# Patient Record
Sex: MALE | Race: WHITE | Employment: UNEMPLOYED | URBAN - NONMETROPOLITAN AREA
[De-identification: names, ages, dates, MRNs, and addresses within clinical notes are randomized per-mention and may not be internally consistent; named-entity substitution may affect disease eponyms.]

---

## 2022-01-01 ENCOUNTER — APPOINTMENT (OUTPATIENT)
Dept: CT IMAGING | Age: 32
End: 2022-01-01
Payer: COMMERCIAL

## 2022-01-01 ENCOUNTER — HOSPITAL ENCOUNTER (EMERGENCY)
Age: 32
Discharge: ANOTHER HOSPITAL | End: 2022-01-02
Attending: INTERNAL MEDICINE
Payer: COMMERCIAL

## 2022-01-01 VITALS
OXYGEN SATURATION: 99 % | BODY MASS INDEX: 24.38 KG/M2 | HEIGHT: 72 IN | RESPIRATION RATE: 15 BRPM | DIASTOLIC BLOOD PRESSURE: 82 MMHG | HEART RATE: 88 BPM | SYSTOLIC BLOOD PRESSURE: 125 MMHG | WEIGHT: 180 LBS

## 2022-01-01 DIAGNOSIS — T18.9XXA SWALLOWED FOREIGN BODY, INITIAL ENCOUNTER: Primary | ICD-10-CM

## 2022-01-01 LAB
ALBUMIN SERPL-MCNC: 4.5 G/DL (ref 3.5–5.1)
ALP BLD-CCNC: 43 U/L (ref 38–126)
ALT SERPL-CCNC: 30 U/L (ref 11–66)
ANION GAP SERPL CALCULATED.3IONS-SCNC: 13 MEQ/L (ref 8–16)
AST SERPL-CCNC: 24 U/L (ref 5–40)
BASOPHILS # BLD: 0.4 %
BASOPHILS ABSOLUTE: 0 THOU/MM3 (ref 0–0.1)
BILIRUB SERPL-MCNC: 0.3 MG/DL (ref 0.3–1.2)
BUN BLDV-MCNC: 19 MG/DL (ref 7–22)
CALCIUM SERPL-MCNC: 9.8 MG/DL (ref 8.5–10.5)
CHLORIDE BLD-SCNC: 105 MEQ/L (ref 98–111)
CO2: 24 MEQ/L (ref 23–33)
CREAT SERPL-MCNC: 0.7 MG/DL (ref 0.4–1.2)
EOSINOPHIL # BLD: 0.3 %
EOSINOPHILS ABSOLUTE: 0 THOU/MM3 (ref 0–0.4)
ERYTHROCYTE [DISTWIDTH] IN BLOOD BY AUTOMATED COUNT: 12.9 % (ref 11.5–14.5)
ERYTHROCYTE [DISTWIDTH] IN BLOOD BY AUTOMATED COUNT: 43.6 FL (ref 35–45)
GFR SERPL CREATININE-BSD FRML MDRD: > 90 ML/MIN/1.73M2
GLUCOSE BLD-MCNC: 104 MG/DL (ref 70–108)
HCT VFR BLD CALC: 40.7 % (ref 42–52)
HEMOGLOBIN: 13.8 GM/DL (ref 14–18)
IMMATURE GRANS (ABS): 0.01 THOU/MM3 (ref 0–0.07)
IMMATURE GRANULOCYTES: 0.1 %
LYMPHOCYTES # BLD: 30 %
LYMPHOCYTES ABSOLUTE: 2.1 THOU/MM3 (ref 1–4.8)
MCH RBC QN AUTO: 31.2 PG (ref 26–33)
MCHC RBC AUTO-ENTMCNC: 33.9 GM/DL (ref 32.2–35.5)
MCV RBC AUTO: 91.9 FL (ref 80–94)
MONOCYTES # BLD: 8 %
MONOCYTES ABSOLUTE: 0.6 THOU/MM3 (ref 0.4–1.3)
NUCLEATED RED BLOOD CELLS: 0 /100 WBC
OSMOLALITY CALCULATION: 285.7 MOSMOL/KG (ref 275–300)
PLATELET # BLD: 225 THOU/MM3 (ref 130–400)
PMV BLD AUTO: 10.3 FL (ref 9.4–12.4)
POTASSIUM REFLEX MAGNESIUM: 4.4 MEQ/L (ref 3.5–5.2)
RBC # BLD: 4.43 MILL/MM3 (ref 4.7–6.1)
SARS-COV-2, NAAT: NOT  DETECTED
SEG NEUTROPHILS: 61.2 %
SEGMENTED NEUTROPHILS ABSOLUTE COUNT: 4.3 THOU/MM3 (ref 1.8–7.7)
SODIUM BLD-SCNC: 142 MEQ/L (ref 135–145)
TOTAL PROTEIN: 7.3 G/DL (ref 6.1–8)
WBC # BLD: 7 THOU/MM3 (ref 4.8–10.8)

## 2022-01-01 PROCEDURE — 85025 COMPLETE CBC W/AUTO DIFF WBC: CPT

## 2022-01-01 PROCEDURE — 80053 COMPREHEN METABOLIC PANEL: CPT

## 2022-01-01 PROCEDURE — 36415 COLL VENOUS BLD VENIPUNCTURE: CPT

## 2022-01-01 PROCEDURE — 99283 EMERGENCY DEPT VISIT LOW MDM: CPT

## 2022-01-01 PROCEDURE — 99284 EMERGENCY DEPT VISIT MOD MDM: CPT

## 2022-01-01 PROCEDURE — 74177 CT ABD & PELVIS W/CONTRAST: CPT

## 2022-01-01 PROCEDURE — 87635 SARS-COV-2 COVID-19 AMP PRB: CPT

## 2022-01-01 PROCEDURE — 96374 THER/PROPH/DIAG INJ IV PUSH: CPT

## 2022-01-01 PROCEDURE — 81001 URINALYSIS AUTO W/SCOPE: CPT

## 2022-01-01 PROCEDURE — 6360000002 HC RX W HCPCS: Performed by: INTERNAL MEDICINE

## 2022-01-01 PROCEDURE — 6360000004 HC RX CONTRAST MEDICATION: Performed by: INTERNAL MEDICINE

## 2022-01-01 PROCEDURE — 87086 URINE CULTURE/COLONY COUNT: CPT

## 2022-01-01 PROCEDURE — 96375 TX/PRO/DX INJ NEW DRUG ADDON: CPT

## 2022-01-01 RX ORDER — OLANZAPINE 10 MG/1
10 TABLET ORAL NIGHTLY
COMMUNITY

## 2022-01-01 RX ORDER — ONDANSETRON 2 MG/ML
4 INJECTION INTRAMUSCULAR; INTRAVENOUS ONCE
Status: COMPLETED | OUTPATIENT
Start: 2022-01-01 | End: 2022-01-01

## 2022-01-01 RX ORDER — FAMOTIDINE 20 MG/1
20 TABLET, FILM COATED ORAL 2 TIMES DAILY
COMMUNITY

## 2022-01-01 RX ORDER — DIVALPROEX SODIUM 250 MG/1
250 TABLET, DELAYED RELEASE ORAL 3 TIMES DAILY
COMMUNITY

## 2022-01-01 RX ORDER — LOPERAMIDE HYDROCHLORIDE 2 MG/1
2 CAPSULE ORAL 4 TIMES DAILY PRN
COMMUNITY

## 2022-01-01 RX ORDER — MORPHINE SULFATE 2 MG/ML
2 INJECTION, SOLUTION INTRAMUSCULAR; INTRAVENOUS ONCE
Status: COMPLETED | OUTPATIENT
Start: 2022-01-01 | End: 2022-01-01

## 2022-01-01 RX ORDER — ATOMOXETINE 40 MG/1
40 CAPSULE ORAL DAILY
COMMUNITY

## 2022-01-01 RX ADMIN — IOPAMIDOL 80 ML: 755 INJECTION, SOLUTION INTRAVENOUS at 21:20

## 2022-01-01 RX ADMIN — ONDANSETRON 4 MG: 2 INJECTION INTRAMUSCULAR; INTRAVENOUS at 22:34

## 2022-01-01 RX ADMIN — MORPHINE SULFATE 2 MG: 2 INJECTION, SOLUTION INTRAMUSCULAR; INTRAVENOUS at 22:34

## 2022-01-01 ASSESSMENT — PAIN SCALES - GENERAL: PAINLEVEL_OUTOF10: 7

## 2022-01-02 LAB
BACTERIA: ABNORMAL /HPF
BILIRUBIN URINE: NEGATIVE
BLOOD, URINE: ABNORMAL
CASTS 2: ABNORMAL /LPF
CASTS UA: ABNORMAL /LPF
CHARACTER, URINE: ABNORMAL
COLOR: YELLOW
CRYSTALS, UA: ABNORMAL
EPITHELIAL CELLS, UA: ABNORMAL /HPF
GLUCOSE URINE: NEGATIVE MG/DL
KETONES, URINE: ABNORMAL
LEUKOCYTE ESTERASE, URINE: NEGATIVE
MISCELLANEOUS 2: ABNORMAL
NITRITE, URINE: NEGATIVE
PH UA: 7 (ref 5–9)
PROTEIN UA: 30
RBC URINE: ABNORMAL /HPF
RENAL EPITHELIAL, UA: PRESENT
SPECIFIC GRAVITY, URINE: > 1.03 (ref 1–1.03)
UROBILINOGEN, URINE: 1 EU/DL (ref 0–1)
WBC UA: ABNORMAL /HPF
YEAST: ABNORMAL

## 2022-01-02 NOTE — ED NOTES
Pt resting quietly in room no needs expressed. Side rails up x2 with call light in reach. Will continue to monitor.        Brian Peres RN  01/01/22 5444

## 2022-01-02 NOTE — ED PROVIDER NOTES
eMERGENCY dEPARTMENT eNCOUnter      [facility]    Chief Complaint   Patient presents with    Ingestion     2 pens, a few screws    Other     plastic in penis       HPI    Charlie Shelley Guardian is a 28 y.o. male who presents emergency department because of abdominal pain. Patient ingested some pens yesterday and also some screws today. Patient also has stuck something in his penile shaft also. Patient swallowed pens yesterday and screws today. Patient is complaining of left upper quadrant abdominal pain. Patient also has been complains of some nausea. There is no vomiting. There is no blood or mucus in his stool. Patient does not have any dysuria frequency    PAST MEDICAL HISTORY    No past medical history on file. SURGICAL HISTORY    No past surgical history on file. CURRENT MEDICATIONS        ALLERGIES    Allergies   Allergen Reactions    Clindamycin/Lincomycin     Haloperidol And Related        FAMILY HISTORY    No family history on file. SOCIAL HISTORY    Social History     Socioeconomic History    Marital status: Single     Spouse name: Not on file    Number of children: Not on file    Years of education: Not on file    Highest education level: Not on file   Occupational History    Not on file   Tobacco Use    Smoking status: Not on file    Smokeless tobacco: Not on file   Substance and Sexual Activity    Alcohol use: Not on file    Drug use: Not on file    Sexual activity: Not on file   Other Topics Concern    Not on file   Social History Narrative    Not on file     Social Determinants of Health     Financial Resource Strain:     Difficulty of Paying Living Expenses: Not on file   Food Insecurity:     Worried About Running Out of Food in the Last Year: Not on file    Kelsy of Food in the Last Year: Not on file   Transportation Needs:     Lack of Transportation (Medical): Not on file    Lack of Transportation (Non-Medical):  Not on file   Physical Activity:     Days of Exercise per Week: Not on file    Minutes of Exercise per Session: Not on file   Stress:     Feeling of Stress : Not on file   Social Connections:     Frequency of Communication with Friends and Family: Not on file    Frequency of Social Gatherings with Friends and Family: Not on file    Attends Denominational Services: Not on file    Active Member of Clubs or Organizations: Not on file    Attends Club or Organization Meetings: Not on file    Marital Status: Not on file   Intimate Partner Violence:     Fear of Current or Ex-Partner: Not on file    Emotionally Abused: Not on file    Physically Abused: Not on file    Sexually Abused: Not on file   Housing Stability:     Unable to Pay for Housing in the Last Year: Not on file    Number of [place] in the Last Year: Not on file    Unstable Housing in the Last Year: Not on file       REVIEW OF SYSTEMS    Constitutional:  Denies fever, chills, weight loss or weakness   Eyes:  Denies photophobia or discharge   HENT:  Denies sore throat or ear pain   Respiratory:  Denies cough or shortness of breath   Cardiovascular:  Denies chest pain, palpitations or swelling   GI: Complaining of abdominal pain, nausea, no vomiting, or diarrhea   Musculoskeletal:  Denies back pain   Skin:  Denies rash   Neurologic:  Denies headache, focal weakness or sensory changes   Endocrine:  Denies polyuria or polydypsia   Lymphatic:  Denies swollen glands   Psychiatric:  Denies depression, suicidal ideation or homicidal ideation   All systems negative except as marked. PHYSICAL EXAM    VITAL SIGNS: /88   Pulse 88   Resp 16   Ht 6' (1.829 m)   Wt 180 lb (81.6 kg)   SpO2 99%   BMI 24.41 kg/m²    Constitutional:  Well developed, Well nourished, No acute distress, Non-toxic appearance. HENT:  Normocephalic, Atraumatic, Bilateral external ears normal, Oropharynx moist, No oral exudates, Nose normal. Neck- Normal range of motion, No tenderness, Supple, No stridor.    Eyes: PERRL, EOMI, Conjunctiva normal, No discharge. Respiratory:  Normal breath sounds, No respiratory distress, No wheezing, No chest tenderness. Cardiovascular:  Normal heart rate, Normal rhythm, No murmurs, No rubs, No gallops. GI:  Bowel sounds normal, Soft, No tenderness, No masses, No pulsatile masses. : External genitalia appear normal, No masses or lesions. No discharge. No CVA tenderness. Musculoskeletal:  Intact distal pulses, No edema, No tenderness, No cyanosis, No clubbing. Good range of motion in all major joints. No tenderness to palpation or major deformities noted. Back- No tenderness. Integument:  Warm, Dry, No erythema, No rash. Lymphatic:  No lymphadenopathy noted. Neurologic:  Alert & oriented x 3, Normal motor function, Normal sensory function, No focal deficits noted. Psychiatric:  Affect normal, Judgment normal, Mood normal.     Labs  Labs Reviewed   CBC WITH AUTO DIFFERENTIAL - Abnormal; Notable for the following components:       Result Value    RBC 4.43 (*)     Hemoglobin 13.8 (*)     Hematocrit 40.7 (*)     All other components within normal limits   COVID-19, RAPID   COMPREHENSIVE METABOLIC PANEL W/ REFLEX TO MG FOR LOW K   ANION GAP   OSMOLALITY   GLOMERULAR FILTRATION RATE, ESTIMATED   URINE RT REFLEX TO CULTURE       RADIOLOGY    CT ABDOMEN PELVIS W IV CONTRAST Additional Contrast? None   Final Result   Radiopaque foreign bodies are present in the region of the hepatic flexure. **This report has been created using voice recognition software. It may contain minor errors which are inherent in voice recognition technology. **      Final report electronically signed by Dr. Ashkan Michelle on 1/1/2022 9:56 PM            ED COURSE & MEDICAL DECISION MAKING    Pertinent Labs & Imaging studies reviewed. (See chart for details)  Labs, CT scan was reviewed. Patient will be transferred to [facility] emergency department under Dr. Julissa Weaver. FINAL IMPRESSION    1.  Swallowed foreign body, initial encounter            Rosina Diaz MD  01/01/22 0657

## 2022-01-02 NOTE — ED NOTES
Pt updated on POC. No needs expressed at this time. Pt VSS.      Marisabel Machuca, SUSY  01/01/22 7197

## 2022-01-02 NOTE — ED TRIAGE NOTES
Pt to the ED via [facility] from residential center in [address] escorted by [facility] from there with complaint of swallowing 2 pens and shoving a plastic piece as far as he could into his urethra. Pt stated that he was informing the guard on duty he was having issues but until pt shoved piece of plastic in penis he would not believe him. Pt stated he has done this in the past. Pt stated that he has LLQ pain. Pt alert and oriented x4. Pt VSS.

## 2022-01-02 NOTE — ED NOTES
Bed: 024A  Expected date:   Expected time:   Means of arrival:   Comments:  Samy Freitas RN  01/01/22 0863

## 2022-01-02 NOTE — ED NOTES
Bladder scan performed per Dr. Karina Webb at this time, 128 ml in bladder.       Franco Hickman RN  01/01/22 2012

## 2022-01-03 LAB — URINE CULTURE REFLEX: NORMAL

## 2022-04-14 ENCOUNTER — APPOINTMENT (OUTPATIENT)
Dept: CT IMAGING | Age: 32
End: 2022-04-14
Payer: COMMERCIAL

## 2022-04-14 ENCOUNTER — HOSPITAL ENCOUNTER (EMERGENCY)
Age: 32
Discharge: HOME | End: 2022-04-14
Attending: EMERGENCY MEDICINE
Payer: COMMERCIAL

## 2022-04-14 ENCOUNTER — APPOINTMENT (OUTPATIENT)
Dept: GENERAL RADIOLOGY | Age: 32
End: 2022-04-14
Payer: COMMERCIAL

## 2022-04-14 VITALS
BODY MASS INDEX: 22.93 KG/M2 | WEIGHT: 173 LBS | SYSTOLIC BLOOD PRESSURE: 123 MMHG | HEIGHT: 73 IN | TEMPERATURE: 98.6 F | HEART RATE: 86 BPM | DIASTOLIC BLOOD PRESSURE: 74 MMHG | OXYGEN SATURATION: 97 % | RESPIRATION RATE: 18 BRPM

## 2022-04-14 DIAGNOSIS — R55 SYNCOPE AND COLLAPSE: Primary | ICD-10-CM

## 2022-04-14 DIAGNOSIS — S01.112A LACERATION OF LEFT EYEBROW, INITIAL ENCOUNTER: ICD-10-CM

## 2022-04-14 LAB
ANION GAP SERPL CALCULATED.3IONS-SCNC: 15 MEQ/L (ref 8–16)
BASOPHILS # BLD: 0.7 %
BASOPHILS ABSOLUTE: 0 THOU/MM3 (ref 0–0.1)
BUN BLDV-MCNC: 22 MG/DL (ref 7–22)
CALCIUM SERPL-MCNC: 10.1 MG/DL (ref 8.5–10.5)
CHLORIDE BLD-SCNC: 102 MEQ/L (ref 98–111)
CO2: 23 MEQ/L (ref 23–33)
CREAT SERPL-MCNC: 0.8 MG/DL (ref 0.4–1.2)
EOSINOPHIL # BLD: 0.9 %
EOSINOPHILS ABSOLUTE: 0.1 THOU/MM3 (ref 0–0.4)
ERYTHROCYTE [DISTWIDTH] IN BLOOD BY AUTOMATED COUNT: 13.1 % (ref 11.5–14.5)
ERYTHROCYTE [DISTWIDTH] IN BLOOD BY AUTOMATED COUNT: 45.1 FL (ref 35–45)
GFR SERPL CREATININE-BSD FRML MDRD: > 90 ML/MIN/1.73M2
GLUCOSE BLD-MCNC: 117 MG/DL (ref 70–108)
HCT VFR BLD CALC: 48.6 % (ref 42–52)
HEMOGLOBIN: 16.2 GM/DL (ref 14–18)
IMMATURE GRANS (ABS): 0.01 THOU/MM3 (ref 0–0.07)
IMMATURE GRANULOCYTES: 0.2 %
LYMPHOCYTES # BLD: 37 %
LYMPHOCYTES ABSOLUTE: 2.1 THOU/MM3 (ref 1–4.8)
MCH RBC QN AUTO: 31.3 PG (ref 26–33)
MCHC RBC AUTO-ENTMCNC: 33.3 GM/DL (ref 32.2–35.5)
MCV RBC AUTO: 94 FL (ref 80–94)
MONOCYTES # BLD: 5.5 %
MONOCYTES ABSOLUTE: 0.3 THOU/MM3 (ref 0.4–1.3)
NUCLEATED RED BLOOD CELLS: 0 /100 WBC
OSMOLALITY CALCULATION: 283.8 MOSMOL/KG (ref 275–300)
PLATELET # BLD: 272 THOU/MM3 (ref 130–400)
PMV BLD AUTO: 10.7 FL (ref 9.4–12.4)
POTASSIUM SERPL-SCNC: 4.3 MEQ/L (ref 3.5–5.2)
RBC # BLD: 5.17 MILL/MM3 (ref 4.7–6.1)
SEG NEUTROPHILS: 55.7 %
SEGMENTED NEUTROPHILS ABSOLUTE COUNT: 3.2 THOU/MM3 (ref 1.8–7.7)
SODIUM BLD-SCNC: 140 MEQ/L (ref 135–145)
TOTAL CK: 63 U/L (ref 55–170)
TROPONIN T: < 0.01 NG/ML
WBC # BLD: 5.8 THOU/MM3 (ref 4.8–10.8)

## 2022-04-14 PROCEDURE — 96374 THER/PROPH/DIAG INJ IV PUSH: CPT

## 2022-04-14 PROCEDURE — 70450 CT HEAD/BRAIN W/O DYE: CPT

## 2022-04-14 PROCEDURE — 6360000002 HC RX W HCPCS: Performed by: STUDENT IN AN ORGANIZED HEALTH CARE EDUCATION/TRAINING PROGRAM

## 2022-04-14 PROCEDURE — 12011 RPR F/E/E/N/L/M 2.5 CM/<: CPT

## 2022-04-14 PROCEDURE — 93005 ELECTROCARDIOGRAM TRACING: CPT | Performed by: EMERGENCY MEDICINE

## 2022-04-14 PROCEDURE — 85025 COMPLETE CBC W/AUTO DIFF WBC: CPT

## 2022-04-14 PROCEDURE — 80048 BASIC METABOLIC PNL TOTAL CA: CPT

## 2022-04-14 PROCEDURE — 74018 RADEX ABDOMEN 1 VIEW: CPT

## 2022-04-14 PROCEDURE — 90471 IMMUNIZATION ADMIN: CPT | Performed by: STUDENT IN AN ORGANIZED HEALTH CARE EDUCATION/TRAINING PROGRAM

## 2022-04-14 PROCEDURE — 90715 TDAP VACCINE 7 YRS/> IM: CPT | Performed by: STUDENT IN AN ORGANIZED HEALTH CARE EDUCATION/TRAINING PROGRAM

## 2022-04-14 PROCEDURE — 2500000003 HC RX 250 WO HCPCS: Performed by: STUDENT IN AN ORGANIZED HEALTH CARE EDUCATION/TRAINING PROGRAM

## 2022-04-14 PROCEDURE — 84484 ASSAY OF TROPONIN QUANT: CPT

## 2022-04-14 PROCEDURE — 82550 ASSAY OF CK (CPK): CPT

## 2022-04-14 PROCEDURE — 6370000000 HC RX 637 (ALT 250 FOR IP): Performed by: STUDENT IN AN ORGANIZED HEALTH CARE EDUCATION/TRAINING PROGRAM

## 2022-04-14 PROCEDURE — 99284 EMERGENCY DEPT VISIT MOD MDM: CPT

## 2022-04-14 RX ORDER — KETOROLAC TROMETHAMINE 30 MG/ML
15 INJECTION, SOLUTION INTRAMUSCULAR; INTRAVENOUS ONCE
Status: COMPLETED | OUTPATIENT
Start: 2022-04-14 | End: 2022-04-14

## 2022-04-14 RX ORDER — ACETAMINOPHEN 500 MG
1000 TABLET ORAL ONCE
Status: COMPLETED | OUTPATIENT
Start: 2022-04-14 | End: 2022-04-14

## 2022-04-14 RX ORDER — LIDOCAINE HYDROCHLORIDE AND EPINEPHRINE 10; 10 MG/ML; UG/ML
20 INJECTION, SOLUTION INFILTRATION; PERINEURAL ONCE
Status: COMPLETED | OUTPATIENT
Start: 2022-04-14 | End: 2022-04-14

## 2022-04-14 RX ADMIN — LIDOCAINE HYDROCHLORIDE,EPINEPHRINE BITARTRATE 20 ML: 10; .01 INJECTION, SOLUTION INFILTRATION; PERINEURAL at 14:55

## 2022-04-14 RX ADMIN — KETOROLAC TROMETHAMINE 15 MG: 30 INJECTION, SOLUTION INTRAMUSCULAR; INTRAVENOUS at 14:14

## 2022-04-14 RX ADMIN — TETANUS TOXOID, REDUCED DIPHTHERIA TOXOID AND ACELLULAR PERTUSSIS VACCINE, ADSORBED 0.5 ML: 5; 2.5; 8; 8; 2.5 SUSPENSION INTRAMUSCULAR at 14:54

## 2022-04-14 RX ADMIN — ACETAMINOPHEN 1000 MG: 500 TABLET ORAL at 12:58

## 2022-04-14 ASSESSMENT — PAIN DESCRIPTION - DESCRIPTORS: DESCRIPTORS: ACHING

## 2022-04-14 ASSESSMENT — PAIN SCALES - GENERAL
PAINLEVEL_OUTOF10: 7
PAINLEVEL_OUTOF10: 5
PAINLEVEL_OUTOF10: 7
PAINLEVEL_OUTOF10: 8

## 2022-04-14 ASSESSMENT — PAIN DESCRIPTION - PAIN TYPE
TYPE: ACUTE PAIN

## 2022-04-14 ASSESSMENT — PAIN - FUNCTIONAL ASSESSMENT
PAIN_FUNCTIONAL_ASSESSMENT: 0-10
PAIN_FUNCTIONAL_ASSESSMENT: 0-10

## 2022-04-14 ASSESSMENT — PAIN DESCRIPTION - LOCATION
LOCATION: HEAD

## 2022-04-14 NOTE — ED NOTES
Patient expected from [facility]. Patient experienced an unwitnessed fall this morning and has a noted gash to his head/eyelid area. Patient was reportedly hypotensive as well at the time. Patient currently feels dizzy. Patient reports that he has a history of seizures though the nursing home system does not have record of these events. Nurse notes that patient has an extensive psychiatric history, most recently, having self mutilated by swallowing and inserting several foreign bodies. Patient to present via squad.      Garett Ware RN  04/14/22 5851

## 2022-04-14 NOTE — ED PROVIDER NOTES
[address] [address]          Pt Name: Mateo Dowling  MRN: [id number]  [place] 1990  Date of evaluation: 4/14/2022  Treating Resident Physician: Doris Vivas MD  Supervising Physician: Daquan Clay, [facility]       Chief Complaint   Patient presents with    Loss of Consciousness     History obtained from the patient. HISTORY OF PRESENT ILLNESS        Charlie Maza is a 28 y.o. male who presents to the emergency department for evaluation of syncope and fall. Patient sent from [place] [address]. Reports waking up on the floor with laceration to eye brow. Denies preceding chest pain or palpitations. Reports bilateral upper extremity tremor following the fall. Blood glucose was taken and it was 158. Patient reports that he used to have seizures as a child but does not take any antiepileptics. Patient reports that a week ago he swallowed 3 pens, put a spoon in his penis and his glasses into his rectum. Glasses were extracted as was the spoon. EGD was performed which removed one of the pounds however the tumor is only gastrointestinal tract. Patient reports generalized abdominal pain but is having normal bowel movements without hematochezia or melena. Denies any nausea or emesis. .  The patient has no other acute complaints at this time. REVIEW OF SYSTEMS   Review of Systems   Constitutional: Negative for chills and fever. HENT: Negative for sore throat and trouble swallowing. Eyes: Negative for photophobia and visual disturbance. Respiratory: Negative for cough and shortness of breath. Cardiovascular: Negative for chest pain, palpitations and leg swelling. Gastrointestinal: Negative for abdominal pain, diarrhea, nausea and vomiting. Genitourinary: Negative for difficulty urinating and flank pain. Musculoskeletal: Negative for arthralgias, back pain, myalgias and neck pain. Skin: Positive for wound. Negative for rash. Neurological: Positive for syncope. Negative for seizures, weakness and headaches. PAST MEDICAL AND SURGICAL HISTORY   History reviewed. No pertinent past medical history. History reviewed. No pertinent surgical history. MEDICATIONS   No current facility-administered medications for this encounter. Current Outpatient Medications:     atomoxetine (STRATTERA) 40 MG capsule, Take 40 mg by mouth daily, Disp: , Rfl:     OLANZapine (ZYPREXA) 10 MG tablet, Take 10 mg by mouth nightly, Disp: , Rfl:     divalproex (DEPAKOTE) 250 MG DR tablet, Take 250 mg by mouth 3 times daily, Disp: , Rfl:     loperamide (IMODIUM) 2 MG capsule, Take 2 mg by mouth 4 times daily as needed for Diarrhea, Disp: , Rfl:     famotidine (PEPCID) 20 MG tablet, Take 20 mg by mouth 2 times daily, Disp: , Rfl:       SOCIAL HISTORY     Social History     Social History Narrative    Not on file     Social History     Tobacco Use    Smoking status: Not on file    Smokeless tobacco: Not on file   Substance Use Topics    Alcohol use: Not on file    Drug use: Not on file         ALLERGIES     Allergies   Allergen Reactions    Clindamycin/Lincomycin     Haloperidol And Related          FAMILY HISTORY   History reviewed. No pertinent family history. PREVIOUS RECORDS   Previous records reviewed: I reviewed the patient's past medical records including relevant labs, imaging and procedures. PHYSICAL EXAM     ED Triage Vitals [04/14/22 1227]   BP Temp Temp Source Pulse Resp SpO2 Height Weight   108/87 98.6 °F (37 °C) Oral 98 18 98 % 6' 1\" (1.854 m) 173 lb (78.5 kg)     Initial vital signs and nursing assessment reviewed and normal. Body mass index is 22.82 kg/m². Pulsoximetry is normal per my interpretation. Additional Vital Signs:  Vitals:    04/14/22 1426   BP: 123/74   Pulse: 86   Resp: 18   Temp:    SpO2: 97%       Physical Exam  Vitals and nursing note reviewed.    Constitutional:       General: He is not in acute distress. Appearance: Normal appearance. He is normal weight. He is not toxic-appearing. HENT:      Head: Normocephalic and atraumatic. Right Ear: Tympanic membrane normal.      Left Ear: Tympanic membrane normal.      Nose: Nose normal.      Mouth/Throat:      Mouth: Mucous membranes are moist.      Pharynx: Oropharynx is clear. Eyes:      General: No scleral icterus. Extraocular Movements: Extraocular movements intact. Conjunctiva/sclera: Conjunctivae normal.      Pupils: Pupils are equal, round, and reactive to light. Cardiovascular:      Rate and Rhythm: Normal rate and regular rhythm. Pulses: Normal pulses. Heart sounds: Normal heart sounds. No murmur heard. No friction rub. No gallop. Pulmonary:      Effort: Pulmonary effort is normal.      Breath sounds: Normal breath sounds. No wheezing or rales. Abdominal:      Palpations: Abdomen is soft. Tenderness: There is no abdominal tenderness. There is no guarding or rebound. Musculoskeletal:         General: Normal range of motion. Cervical back: Normal range of motion and neck supple. Right lower leg: No edema. Left lower leg: No edema. Skin:     General: Skin is warm and dry. Capillary Refill: Capillary refill takes less than 2 seconds. Comments: 2 cm laceration over the left eyebrow   Neurological:      General: No focal deficit present. Mental Status: He is alert and oriented to person, place, and time. Cranial Nerves: No cranial nerve deficit. Sensory: No sensory deficit. Motor: No weakness.       Coordination: Coordination normal.               MEDICAL DECISION MAKING   Initial Assessment:   42-year-old male [facility] with [facility] of [facility]      Differential diagnosis includes but is not limited to:  Cardiac arrhythmia/ valvular, hypoglycemia, anemia, SAH, dissection, PE, AAA rupture, seizure, vasovagal, orthostatic      Although some of these diagnoses are unlikely they were considered in my medical decision making. Plan:    Suture  Syncope work-up  Noncon CT head  KUB to evaluate for ingested foreign body      MDM:   Patient's vitals are within normal limits. EKG revealed normal sinus rhythm. Troponin was not elevated. I do not suspect cardiac syncope as a cause as patient is lost consciousness. Patient displayed no postictal symptoms. No leukocytosis or elevated CK. Patient's glucose was normal.  Gait and syncope score is -2 indicating a very low risk. CT findings were negative for any acute process        ED RESULTS   Laboratory results:  Labs Reviewed   CBC WITH AUTO DIFFERENTIAL - Abnormal; Notable for the following components:       Result Value    RDW-SD 45.1 (*)     Monocytes Absolute 0.3 (*)     All other components within normal limits   BASIC METABOLIC PANEL - Abnormal; Notable for the following components:    Glucose 117 (*)     All other components within normal limits   TROPONIN   CK   ANION GAP   GLOMERULAR FILTRATION RATE, ESTIMATED   OSMOLALITY   BLOOD OCCULT STOOL SCREEN #1       Radiologic studies results:  CT HEAD WO CONTRAST   Final Result      No mass effect or acute hemorrhage. **This report has been created using voice recognition software. It may contain minor errors which are inherent in voice recognition technology. **      Final report electronically signed by Dr. Sylvester Reid on 4/14/2022 1:42 PM      XR ABDOMEN (KUB) (SINGLE AP VIEW)   Final Result      Nonobstructive bowel gas pattern with a moderate amount retained stool in the colon.       Final report electronically signed by Dr. Sylvester Reid on 4/14/2022 1:24 PM          ED Medications administered this visit:   Medications   acetaminophen (TYLENOL) tablet 1,000 mg (1,000 mg Oral Given 4/14/22 1258)   lidocaine-EPINEPHrine 1 %-1:599676 injection 20 mL (20 mLs IntraDERmal Given by Other 4/14/22 1913)   ketorolac (TORADOL) injection 15 mg (15 mg IntraVENous Given 4/14/22 1414)   Tetanus-Diphth-Acell Pertussis (BOOSTRIX) injection 0.5 mL (0.5 mLs IntraMUSCular Given 4/14/22 1454)         ED COURSE     ED Course as of 04/16/22 0104   u Apr 14, 2022   1258 Does not meet criteria for C-spine imaging via [place] C-spine rule [TM]   1336 Blood occult stool screen #1  Unable to obtain stool. Rectal vault empty [TM]   1336 XR ABDOMEN (KUB) (SINGLE AP VIEW)  IMPRESSION:     Nonobstructive bowel gas pattern with a moderate amount retained stool in the colon. [TM]   1340 Troponin T: < 0.010 [TM]   1359 IMPRESSION:     No mass effect or acute hemorrhage. [TM]   1436 Total CK: 63 [TM]      ED Course User Index  [TM] Jude Wyatt MD       Lac Repair    Date/Time: 4/14/2022 2:12 PM  Performed by: Jude Wyatt MD  Authorized by: Inna Hood DO     Consent:     Consent obtained:  Verbal    Consent given by:  Patient    Risks discussed:  Infection, poor cosmetic result, poor wound healing, need for additional repair and nerve damage    Alternatives discussed:  No treatment  Anesthesia (see MAR for exact dosages): Anesthesia method:  Local infiltration    Local anesthetic:  Lidocaine 1% WITH epi  Laceration details:     Location:  Face    Face location:  L eyebrow    Length (cm):  2  Repair type:     Repair type:  Simple  Pre-procedure details:     Preparation:  Patient was prepped and draped in usual sterile fashion  Exploration:     Hemostasis achieved with:  Epinephrine    Contaminated: no    Treatment:     Area cleansed with:  Saline    Amount of cleaning:  Standard    Irrigation solution:  Sterile water    Irrigation method:  Pressure wash    Visualized foreign bodies/material removed: no    Skin repair:     Repair method:  Sutures    Suture size:  6-0    Suture material:  Nylon    Suture technique:  Simple interrupted    Number of sutures:  4  Approximation:     Approximation:  Close  Post-procedure details:     Patient tolerance of procedure:   Tolerated well, no immediate complications      Strict return precautions and follow up instructions were discussed with the patient prior to discharge, with which the patient agrees. MEDICATION CHANGES     Discharge Medication List as of 4/14/2022  2:15 PM            FINAL DISPOSITION     Final diagnoses:   Syncope and collapse   Laceration of left eyebrow, initial encounter     Condition: condition: stable  Dispo: Discharge to home      This transcription was electronically signed. Parts of this transcriptions may have been dictated by use of voice recognition software and electronically transcribed, and parts may have been transcribed with the assistance of an ED scribe. The transcription may contain errors not detected in proofreading. Please refer to my supervising physician's documentation if my documentation differs.     Electronically Signed: Janelle Zafar MD, 04/16/22, 1:04 AM         Myron Murcia MD  Resident  04/14/22 [place] Consuelo Parra MD  Resident  04/16/22 5537

## 2022-04-14 NOTE — ED TRIAGE NOTES
Pt presents to the ED via EMS from [facility] after loss of consciousness. Pt states he remembers standing by a door and then waking up on the floor with blood around his head. The fall was unwitnessed and unsure of how long pt was unconscious. Pt complaining of pain in his head. Pt states he had had black stool for a couple of days. EMS gave pt 4mg zofran. EMS states they were called out for hypotension as well. Pt states on 4/8 he swallowed 3 pens, put a spoon in his penis and glasses into his rectum. All objects except for 2 pens were removed due to them being too far down.  Pt is alert and oriented upon arrival.

## 2022-04-15 LAB
EKG ATRIAL RATE: 91 BPM
EKG P AXIS: 80 DEGREES
EKG P-R INTERVAL: 144 MS
EKG Q-T INTERVAL: 340 MS
EKG QRS DURATION: 92 MS
EKG QTC CALCULATION (BAZETT): 418 MS
EKG R AXIS: 80 DEGREES
EKG T AXIS: 80 DEGREES
EKG VENTRICULAR RATE: 91 BPM

## 2022-04-15 PROCEDURE — 93010 ELECTROCARDIOGRAM REPORT: CPT | Performed by: INTERNAL MEDICINE

## 2022-04-16 ASSESSMENT — ENCOUNTER SYMPTOMS
BACK PAIN: 0
SHORTNESS OF BREATH: 0
COUGH: 0
ABDOMINAL PAIN: 0
NAUSEA: 0
VOMITING: 0
SORE THROAT: 0
TROUBLE SWALLOWING: 0
PHOTOPHOBIA: 0
DIARRHEA: 0

## 2022-05-22 ENCOUNTER — APPOINTMENT (OUTPATIENT)
Dept: GENERAL RADIOLOGY | Age: 32
End: 2022-05-22
Payer: COMMERCIAL

## 2022-05-22 ENCOUNTER — HOSPITAL ENCOUNTER (EMERGENCY)
Age: 32
Discharge: ANOTHER HOSPITAL | End: 2022-05-22
Payer: COMMERCIAL

## 2022-05-22 VITALS
RESPIRATION RATE: 8 BRPM | HEIGHT: 73 IN | DIASTOLIC BLOOD PRESSURE: 72 MMHG | TEMPERATURE: 98.4 F | BODY MASS INDEX: 23.03 KG/M2 | HEART RATE: 64 BPM | WEIGHT: 173.8 LBS | OXYGEN SATURATION: 98 % | SYSTOLIC BLOOD PRESSURE: 114 MMHG

## 2022-05-22 DIAGNOSIS — T19.0XXA URETHRAL FOREIGN BODY, INITIAL ENCOUNTER: ICD-10-CM

## 2022-05-22 DIAGNOSIS — T18.9XXA SWALLOWED FOREIGN BODY, INITIAL ENCOUNTER: Primary | ICD-10-CM

## 2022-05-22 LAB
EKG ATRIAL RATE: 67 BPM
EKG P AXIS: 58 DEGREES
EKG P-R INTERVAL: 150 MS
EKG Q-T INTERVAL: 366 MS
EKG QRS DURATION: 96 MS
EKG QTC CALCULATION (BAZETT): 386 MS
EKG R AXIS: 69 DEGREES
EKG T AXIS: 70 DEGREES
EKG VENTRICULAR RATE: 67 BPM

## 2022-05-22 PROCEDURE — 70360 X-RAY EXAM OF NECK: CPT

## 2022-05-22 PROCEDURE — 74018 RADEX ABDOMEN 1 VIEW: CPT

## 2022-05-22 PROCEDURE — 71045 X-RAY EXAM CHEST 1 VIEW: CPT

## 2022-05-22 PROCEDURE — 93005 ELECTROCARDIOGRAM TRACING: CPT | Performed by: PHYSICIAN ASSISTANT

## 2022-05-22 PROCEDURE — 99285 EMERGENCY DEPT VISIT HI MDM: CPT

## 2022-05-22 PROCEDURE — 93010 ELECTROCARDIOGRAM REPORT: CPT | Performed by: INTERNAL MEDICINE

## 2022-05-22 ASSESSMENT — PAIN DESCRIPTION - DESCRIPTORS: DESCRIPTORS: ACHING

## 2022-05-22 ASSESSMENT — ENCOUNTER SYMPTOMS
ABDOMINAL PAIN: 0
NAUSEA: 0
RHINORRHEA: 0
PHOTOPHOBIA: 0
SHORTNESS OF BREATH: 0
DIARRHEA: 0
COUGH: 0
TROUBLE SWALLOWING: 0
BACK PAIN: 0
VOMITING: 0
SORE THROAT: 0

## 2022-05-22 ASSESSMENT — PAIN DESCRIPTION - LOCATION: LOCATION: THROAT;PENIS

## 2022-05-22 ASSESSMENT — PAIN SCALES - GENERAL: PAINLEVEL_OUTOF10: 7

## 2022-05-22 ASSESSMENT — PAIN - FUNCTIONAL ASSESSMENT: PAIN_FUNCTIONAL_ASSESSMENT: 0-10

## 2022-05-22 ASSESSMENT — PAIN DESCRIPTION - FREQUENCY: FREQUENCY: CONTINUOUS

## 2022-05-22 NOTE — ED NOTES
Nurse Jeramie Shannon from [facility] states inmate, at approx 1500, reported ingesting approx IBUprofen 325mg x25 as well as razor blades. Inmate also reports sticking ball point pens, sewing needles and a plastic spoon handle into the penis. Nurse Jeramie Shannon reports small amount of drainage noted from the penis. Nurse Jeramie Shannon states no other outward symptoms. Reports pt will be coming by state vehicle.      Romana Dallas, RN  05/22/22 1603

## 2022-05-22 NOTE — ED NOTES
Pt requesting something for pain. Informed pt of no orders for medication at this time. Pt remains A&Ox4, resps easy and unlabored. Will monitor.      Diane Delacruz RN  05/22/22 9178

## 2022-05-22 NOTE — ED PROVIDER NOTES
[facility] EMERGENCY DEPT      CHIEF COMPLAINT       Chief Complaint   Patient presents with    Ingestion     10 razor blades, 2 pens, 1 sewing needle    Other     Put 1 broken, plastic spoon handle and 3 sewing needles into penis       Nurses Notes reviewed and I agree except as noted in the HPI. HISTORY OF PRESENT ILLNESS    Charlie Cueto is a 28 y.o. male who presents because he \"swallowed a bunch of things. \"  Patient reports at 1500 he swallowed a pen, a safety pin, 10 razor blades, and metal pieces that were in his sewing kit. Patient reports that since swallowing the pen it hurts to swallow and he is concerned it is stuck in his throat. Patient also admits to inserting 3 sewing needles and a broken handle of a spoon in his urethra. Patient affirms penile pain in the urge to urinate. Patient informs me he did these things to \"get rid of the emotional pain\" from his sister's death yesterday. Patient denies chest pain, dyspnea, abdominal pain, difficulty swallowing, suicidal ideation, or other complaints. REVIEW OF SYSTEMS     Review of Systems   Constitutional: Negative for chills and fever. HENT: Negative for congestion, ear pain, rhinorrhea, sore throat and trouble swallowing. Pain when swallowing   Eyes: Negative for photophobia. Respiratory: Negative for cough and shortness of breath. Cardiovascular: Negative for chest pain. Gastrointestinal: Negative for abdominal pain, diarrhea, nausea and vomiting. Endocrine: Negative for polyuria. Genitourinary: Positive for difficulty urinating and penile pain. Negative for dysuria, flank pain and frequency. Musculoskeletal: Negative for back pain and gait problem. Skin: Negative for rash and wound. Neurological: Negative for dizziness, weakness and numbness. Psychiatric/Behavioral: Positive for dysphoric mood and self-injury. Negative for confusion and suicidal ideas.         PAST MEDICAL HISTORY    has no past medical history on file. SURGICAL HISTORY      has no past surgical history on file. CURRENT MEDICATIONS       Discharge Medication List as of 5/22/2022  6:09 PM      CONTINUE these medications which have NOT CHANGED    Details   atomoxetine (STRATTERA) 40 MG capsule Take 40 mg by mouth dailyHistorical Med      OLANZapine (ZYPREXA) 10 MG tablet Take 10 mg by mouth nightlyHistorical Med      divalproex (DEPAKOTE) 250 MG DR tablet Take 250 mg by mouth 3 times dailyHistorical Med      loperamide (IMODIUM) 2 MG capsule Take 2 mg by mouth 4 times daily as needed for DiarrheaHistorical Med      famotidine (PEPCID) 20 MG tablet Take 20 mg by mouth 2 times dailyHistorical Med             ALLERGIES     is allergic to clindamycin/lincomycin and haloperidol and related. FAMILY HISTORY     has no family status information on file. family history is not on file. SOCIAL HISTORY        PHYSICAL EXAM     INITIAL VITALS:  height is 6' 1\" (1.854 m) and weight is 173 lb 12.8 oz (78.8 kg). His oral temperature is 98.4 °F (36.9 °C). His blood pressure is 114/72 and his pulse is 64. His respiration is 8 and oxygen saturation is 98%. Physical Exam  Vitals and nursing note reviewed. Exam conducted with a chaperone present. Constitutional:       General: He is not in acute distress. Appearance: He is well-developed. He is not toxic-appearing or diaphoretic. HENT:      Head: Normocephalic and atraumatic. Right Ear: Hearing normal.      Left Ear: Hearing normal.      Nose: Nose normal. No rhinorrhea. Mouth/Throat:      Lips: Pink. Mouth: Mucous membranes are moist.      Pharynx: Uvula midline. No oropharyngeal exudate. Comments: No visualized injury or foreign body. Patient managing own secretions. Speech clear. Eyes:      General: Lids are normal. No scleral icterus. Conjunctiva/sclera: Conjunctivae normal.      Pupils: Pupils are equal, round, and reactive to light.    Neck:      Trachea: No tracheal deviation. Cardiovascular:      Rate and Rhythm: Normal rate and regular rhythm. Heart sounds: Normal heart sounds. No murmur heard. Pulmonary:      Effort: Pulmonary effort is normal. No respiratory distress. Breath sounds: Normal breath sounds. No stridor. No decreased breath sounds or wheezing. Abdominal:      General: There is no distension. Palpations: Abdomen is soft. Abdomen is not rigid. Tenderness: There is no abdominal tenderness. There is no guarding. Comments: No bladder distention appreciated   Genitourinary:     Penis: Circumcised. Tenderness present. No discharge, swelling or lesions. Musculoskeletal:         General: Normal range of motion. Cervical back: Normal range of motion and neck supple. No rigidity. Lymphadenopathy:      Cervical: No cervical adenopathy. Skin:     General: Skin is warm and dry. Coloration: Skin is not pale. Findings: No rash. Neurological:      Mental Status: He is alert and oriented to person, place, and time. GCS: GCS eye subscore is 4. GCS verbal subscore is 5. GCS motor subscore is 6. Gait: Gait normal.      Comments: No gross deficits observed   Psychiatric:         Mood and Affect: Mood normal.         Speech: Speech normal.         Behavior: Behavior normal.         Thought Content: Thought content normal.         DIFFERENTIAL DIAGNOSIS:   Including but not limited to: Swallowed foreign body, esophageal abrasion, penile foreign body, urinary retention    DIAGNOSTIC RESULTS     EKG: All EKG's are [facility] who either signs or Co-signs this chart in the absence of a cardiologist.  None    RADIOLOGY: non-plain film images(s) such as CT,Ultrasound and MRI are read by the radiologist.  Plain radiographic images are visualized and preliminarily interpreted by the emergency physician unless otherwise stated below.   XR CHEST 1 VIEW   Final Result   Possible 8 mm foreign body midline. **This report has been created using voice recognition software. It may contain minor errors which are inherent in voice recognition technology. **      Final report electronically signed by Dr. Ingrid Borrego on 5/22/2022 5:29 PM      XR NECK SOFT TISSUE   Final Result   Normal soft tissue neck            **This report has been created using voice recognition software. It may contain minor errors which are inherent in voice recognition technology. **      Final report electronically signed by Dr. Inrgid Borrego on 5/22/2022 5:29 PM      XR ABDOMEN (KUB) (SINGLE AP VIEW)   Final Result   Multiple radiopaque foreign bodies in the abdomen lower chest and penis            **This report has been created using voice recognition software. It may contain minor errors which are inherent in voice recognition technology. **      Final report electronically signed by Dr. Ingrid Borrego on 5/22/2022 5:32 PM          LABS:   Labs Reviewed - No data to display    EMERGENCY DEPARTMENT COURSE:   Vitals:    Vitals:    05/22/22 1601 05/22/22 1701   BP: 117/84 114/72   Pulse: 65 64   Resp: 18 8   Temp: 98.4 °F (36.9 °C)    TempSrc: Oral    SpO2: 99% 98%   Weight: 173 lb 12.8 oz (78.8 kg)    Height: 6' 1\" (1.854 m)        Patient was seen in the emergency department during the global pandemic, when there was surge capacity and regional healthcare crisis. MDM:  The patient was seen and evaluated within the ED today for swallowed foreign bodies and urethral inserted foreign bodies. On exam, I appreciated no acute findings. There was a palpable linear foreign body to the dorsal aspect of the penis. No wounds or lesions noted. No bleeding. Patient's speech was clear and he was managing own secretions. There was no respiratory distress. Old records were reviewed. Within the department, I observed the patient's vital signs to be within acceptable range.  Radiological studies within the department revealed multiple radiopaque foreign bodies in the abdomen, lower chest, and penis. Laboratory work was not deemed necessary at this point. Within the department the patient was monitored and kept n.p.o. I observed the patient's condition to remain stable* during the duration of their stay. I discussed this patient with my attending physician, Dr. Pasha Brown, who aided in medical decision making and advised transfer to [facility]. Leila Angel from the transfer line was called and advised to send the patient to [facility] ED with Dr. Toño Romeo accepting. Transport unfortunately via EMS would take too long and snf guards here were willing to transport the patient. Patient was stable and deemed appropriate for transfer by snf guards. CRITICAL CARE:   None    CONSULTS:  None    PROCEDURES:  None    FINAL IMPRESSION      1. Swallowed foreign body, initial encounter    2. Urethral foreign body, initial encounter          DISPOSITION/PLAN     1. Swallowed foreign body, initial encounter    2.  Urethral foreign body, initial encounter        PATIENT REFERRED TO:  [facility] [address]  [address] [address]  [phone]    Go today  now      DISCHARGE MEDICATIONS:  Discharge Medication List as of 5/22/2022  6:09 PM          (Please note that portions of this note were completed with a voice recognition program.  Efforts were made to edit the dictations but occasionally words are mis-transcribed.)    Daryle Perone, PA-C 05/22/22 7:49 PM    Daryle Perone, PA-C Daryle Perone, PA-C  05/22/22 2016

## 2022-05-22 NOTE — ED NOTES
[facility], swallowing foreign objects and placing foreign objects into the penis. Pt reports swallowing 2 whole ball point pens, approx 10 razor blades and a sewing needle. Pt also reports placing a broken handle from a plastic spoon and 3 sewing needles into urethra. Pt reports sister passed mamie yesterday and \"I just wanted to take away my pain. \" Pt reports pain from sister's death is gone, but now there is pain in the throat and penis. Upon initial assessment, pt walked from state van to ED room 4. Pt is A&Ox4, resps easy and unlabored. Pt reports sore throat at this time. Pt reports pain 7/10. Did not attempt swallow screen at this time d/t ingestion. Awaiting approval from doctor to perform. IV attempts x4 unsuccessful. EKG completed upon arrival. Pt is stable and in no apparent distress. Guards at bedside. Awaiting provider assessment. Will monitor.      Diane Delacruz RN  05/22/22 4082

## 2022-05-22 NOTE — ED NOTES
Decision made by Ping CASTRO that pt is stable enough to be transferred to [facility] ED via state [facility] Balls and group home guards. Guards at bedside ok with transfer. Transfer center notified of update. Imaging pushed through to [facility]. Paperwork provided to guards and pt discharged in stable condition.      Bernardo Cavazos RN  05/22/22 2214

## 2022-05-26 ENCOUNTER — APPOINTMENT (OUTPATIENT)
Dept: GENERAL RADIOLOGY | Age: 32
End: 2022-05-26
Payer: COMMERCIAL

## 2022-05-26 ENCOUNTER — HOSPITAL ENCOUNTER (EMERGENCY)
Age: 32
Discharge: ANOTHER HOSPITAL | End: 2022-05-26
Payer: COMMERCIAL

## 2022-05-26 VITALS
DIASTOLIC BLOOD PRESSURE: 66 MMHG | TEMPERATURE: 98.4 F | RESPIRATION RATE: 16 BRPM | BODY MASS INDEX: 22.53 KG/M2 | HEART RATE: 87 BPM | WEIGHT: 170 LBS | OXYGEN SATURATION: 98 % | HEIGHT: 73 IN | SYSTOLIC BLOOD PRESSURE: 97 MMHG

## 2022-05-26 DIAGNOSIS — T18.9XXA SWALLOWED FOREIGN BODY, INITIAL ENCOUNTER: ICD-10-CM

## 2022-05-26 DIAGNOSIS — T19.4XXA FOREIGN BODY IN PENIS, INITIAL ENCOUNTER: Primary | ICD-10-CM

## 2022-05-26 PROCEDURE — 99285 EMERGENCY DEPT VISIT HI MDM: CPT

## 2022-05-26 PROCEDURE — 96375 TX/PRO/DX INJ NEW DRUG ADDON: CPT

## 2022-05-26 PROCEDURE — 70360 X-RAY EXAM OF NECK: CPT

## 2022-05-26 PROCEDURE — 74018 RADEX ABDOMEN 1 VIEW: CPT

## 2022-05-26 PROCEDURE — 71045 X-RAY EXAM CHEST 1 VIEW: CPT

## 2022-05-26 PROCEDURE — 6360000002 HC RX W HCPCS: Performed by: NURSE PRACTITIONER

## 2022-05-26 PROCEDURE — 96365 THER/PROPH/DIAG IV INF INIT: CPT

## 2022-05-26 RX ORDER — KETOROLAC TROMETHAMINE 30 MG/ML
30 INJECTION, SOLUTION INTRAMUSCULAR; INTRAVENOUS ONCE
Status: COMPLETED | OUTPATIENT
Start: 2022-05-26 | End: 2022-05-26

## 2022-05-26 RX ADMIN — CEFAZOLIN SODIUM 2000 MG: 10 INJECTION, POWDER, FOR SOLUTION INTRAVENOUS at 23:08

## 2022-05-26 RX ADMIN — KETOROLAC TROMETHAMINE 30 MG: 30 INJECTION, SOLUTION INTRAMUSCULAR at 22:49

## 2022-05-26 ASSESSMENT — PAIN - FUNCTIONAL ASSESSMENT: PAIN_FUNCTIONAL_ASSESSMENT: 0-10

## 2022-05-26 ASSESSMENT — PAIN SCALES - GENERAL: PAINLEVEL_OUTOF10: 8

## 2022-05-27 ASSESSMENT — ENCOUNTER SYMPTOMS
EYE REDNESS: 0
ABDOMINAL PAIN: 0
NAUSEA: 0
RHINORRHEA: 0
CHEST TIGHTNESS: 0
BACK PAIN: 0
COUGH: 0
VOMITING: 0

## 2022-05-27 NOTE — ED TRIAGE NOTES
Pt presents to ED from [facility] for evaluation of foreign object in penis. Pt states that he shoved the handle of a plastic spoon up urethra around 2030 and snapped it off inside. Pt states current pain 8/10. Denies CP or SOB. Vitals obtained at this time.

## 2022-05-27 NOTE — ED PROVIDER NOTES
[facility] Emergency Department    CHIEF COMPLAINT       Chief Complaint   Patient presents with    Other     foreign body in penis. Nurses Notes reviewed and I agree except as noted in the HPI. HISTORY OF PRESENT ILLNESS    Charlie Carrero ирина 28 y.o. male who presents to the ED for evaluation of foreign body in the penis. The patient stuck the handle of a plastic spoon in his penis. He also admits to swallowing the inside of a pen. Penis is bleeding. HPI was provided by the patient    REVIEW OF SYSTEMS     Review of Systems   Constitutional: Negative for chills, fatigue and fever. HENT: Negative for congestion, ear discharge, ear pain, postnasal drip and rhinorrhea. Eyes: Negative for redness. Respiratory: Negative for cough and chest tightness. Cardiovascular: Negative for chest pain and leg swelling. Gastrointestinal: Negative for abdominal pain, nausea and vomiting. Genitourinary: Positive for penile pain. Negative for difficulty urinating, dysuria, enuresis, flank pain and hematuria. Musculoskeletal: Negative for back pain and joint swelling. Skin: Negative for rash. Neurological: Negative for dizziness, light-headedness, numbness and headaches. Psychiatric/Behavioral: Negative for agitation, behavioral problems and confusion. All other systems negative except as noted. PAST MEDICAL HISTORY   History reviewed. No pertinent past medical history. SURGICALHISTORY      has no past surgical history on file.     CURRENT MEDICATIONS       Discharge Medication List as of 5/26/2022 11:48 PM      CONTINUE these medications which have NOT CHANGED    Details   atomoxetine (STRATTERA) 40 MG capsule Take 40 mg by mouth dailyHistorical Med      OLANZapine (ZYPREXA) 10 MG tablet Take 10 mg by mouth nightlyHistorical Med      divalproex (DEPAKOTE) 250 MG DR tablet Take 250 mg by mouth 3 times dailyHistorical Med      loperamide (IMODIUM) 2 MG capsule Take 2 mg by mouth 4 times daily as needed for DiarrheaHistorical Med      famotidine (PEPCID) 20 MG tablet Take 20 mg by mouth 2 times dailyHistorical Med             ALLERGIES     is allergic to clindamycin/lincomycin and haloperidol and related. FAMILY HISTORY     has no family status information on file. family history is not on file. SOCIAL HISTORY       Social History     Socioeconomic History    Marital status: Single     Spouse name: Not on file    Number of children: Not on file    Years of education: Not on file    Highest education level: Not on file   Occupational History    Not on file   Tobacco Use    Smoking status: Not on file    Smokeless tobacco: Not on file   Substance and Sexual Activity    Alcohol use: Not on file    Drug use: Not on file    Sexual activity: Not on file   Other Topics Concern    Not on file   Social History Narrative    Not on file     Social Determinants of Health     Financial Resource Strain:     Difficulty of Paying Living Expenses: Not on file   Food Insecurity:     Worried About Running Out of Food in the Last Year: Not on file    Kelsy of Food in the Last Year: Not on file   Transportation Needs:     Lack of Transportation (Medical): Not on file    Lack of Transportation (Non-Medical):  Not on file   Physical Activity:     Days of Exercise per Week: Not on file    Minutes of Exercise per Session: Not on file   Stress:     Feeling of Stress : Not on file   Social Connections:     Frequency of Communication with Friends and Family: Not on file    Frequency of Social Gatherings with Friends and Family: Not on file    Attends Scientology Services: Not on file    Active Member of Clubs or Organizations: Not on file    Attends Club or Organization Meetings: Not on file    Marital Status: Not on file   Intimate Partner Violence:     Fear of Current or Ex-Partner: Not on file    Emotionally Abused: Not on file    Physically Abused: Not on file    Sexually Abused: Not on file   Housing Stability:     Unable to Pay for Housing in the Last Year: Not on file    Number of Places Lived in the Last Year: Not on file    Unstable Housing in the Last Year: Not on file       PHYSICAL EXAM     INITIAL VITALS:  height is 6' 1\" (1.854 m) and weight is 170 lb (77.1 kg). His oral temperature is 98.4 °F (36.9 °C). His blood pressure is 97/66 and his pulse is 87. His respiration is 16 and oxygen saturation is 98%. Physical Exam  Constitutional:       Appearance: Normal appearance. He is well-developed. He is not ill-appearing. HENT:      Head: Normocephalic and atraumatic. Nose: Nose normal.      Mouth/Throat:      Mouth: Mucous membranes are moist.      Pharynx: Oropharynx is clear. Eyes:      Conjunctiva/sclera: Conjunctivae normal.   Cardiovascular:      Rate and Rhythm: Normal rate. Pulses: Normal pulses. Pulmonary:      Effort: Pulmonary effort is normal.   Abdominal:      Palpations: Abdomen is soft. Musculoskeletal:         General: Normal range of motion. Cervical back: Normal range of motion. Skin:     General: Skin is warm and dry. Capillary Refill: Capillary refill takes less than 2 seconds. Neurological:      General: No focal deficit present. Mental Status: He is alert and oriented to person, place, and time. Psychiatric:         Behavior: Behavior normal.         DIFFERENTIAL DIAGNOSIS:   Foreign body in penis, swallowed foreign body    DIAGNOSTIC RESULTS     EKG: All EKG's are interpreted by the Emergency Department Physician who eithersigns or Co-signs this chart in the absence of a cardiologist.        RADIOLOGY: non-plainfilm images(s) such as CT, Ultrasound and MRI are read by the radiologist.  Plain radiographic images are visualized and preliminarily interpreted by the emergency physician unless otherwise stated below. XR CHEST PORTABLE   Final Result   1.  No radiopaque foreign body overlying the chest.   2. No acute cardiopulmonary process. This document has been electronically signed by: Moe Cox DO, MBA on    05/26/2022 11:11 PM      XR NECK SOFT TISSUE   Final Result   No acute findings. No radiopaque ingested foreign body. This document has been electronically signed by: Moe Cox DO, MBA on    05/26/2022 11:13 PM      XR ABDOMEN (KUB) (SINGLE AP VIEW)   Final Result   1. Two metallic densities foreign bodies overlying the right sacral ala. The smaller metallic density at this region measures up to 5-6 mm    overlying the superior lateral aspect of the right sacral ala. Overlying    the right hemipelvis is an additional metallic density measuring up to 2.2    cm. 2. Metallic density foreign body overlying the mid aspect of the right    ascending colon measures up to 1.3 cm in length. A paperclip overlying the    right mid abdomen. Additional metallic foreign body density within the    right upper quadrant measures 1.2 cm. Additional metallic foreign body    overlying the proximal descending colon measures 1.4 cm. 3. Previous identified linear density overlying the penis is not    visualized on the current exam.      This document has been electronically signed by: Moe Cox DO, MBA on    05/26/2022 11:07 PM               LABS:   Labs Reviewed - No data to display    EMERGENCY DEPARTMENT COURSE:   Vitals:    Vitals:    05/26/22 2246 05/26/22 2256 05/26/22 2316 05/26/22 2326   BP: 94/64 98/66 96/64 97/66   Pulse:       Resp:       Temp:       TempSrc:       SpO2:       Weight:       Height:                                              Internal Administration   First Dose      Second Dose           Last COVID Lab SARS-CoV-2, NAAT (no units)   Date Value   01/01/2022 NOT  DETECTED            MDM    Patient was seen and evaluated in the emergency department, patient appeared to be in stable condition. Vital signs assessed, no abnormality noted. Physical exam completed. Penile bleeding noted.  Jamia Calvin Ordered. Based on my physical exam and work up I believe the patient has a fb in the penis and multiple ingested foreign bodies. I discussed my findings and plan of care with patient. They are amenable with transfer to [facility]. While here in the emergency department they maintained a stable course and were appropriate for transfer. No notes of  Admission Criteria type on file. Medications   ketorolac (TORADOL) injection 30 mg (30 mg IntraVENous Given 5/26/22 4266)   ceFAZolin (ANCEF) 2000 mg in dextrose 5 % 50 mL IVPB (0 mg IntraVENous Stopped 5/26/22 1376)       Please note that the patient was evaluated during a pandemic. All efforts were made for HIPPA compliance as well as provision of appropriate care. Patient was seen independently by myself. The patient's final impression and disposition and plan was determined by myself. Strict return precautions and follow up instructions were discussed with the patient prior to discharge, with which the patient agrees. Physical assessment findings, diagnostic testing(s) if applicable, and vital signs reviewed with patient/patient representative. Questions answered. Medications asdirected, including OTC medications for supportive care. Education provided on medications. Differential diagnosis(s) discussed with patient/patient representative. Home care/self care instructions reviewed withpatient/patient representative. Patient is to follow-up with family care provider in 2-3 days if no improvement. Patient is to go to the emergency department if symptoms worsen. Patient/patient representative isaware of care plan, questions answered, verbalizes understanding and is in agreement. CRITICAL CARE:   None    CONSULTS:  OTHER--[facility] transfer with DR. Hairston    PROCEDURES:  None    FINAL IMPRESSION     1. Foreign body in penis, initial encounter    2.  Swallowed foreign body, initial encounter          DISPOSITION/PLAN   DISPOSITION Decision To Transfer 05/26/2022 10:23:25 PM      PATIENT REFERREDTO:  No follow-up provider specified.     DISCHARGE MEDICATIONS:  Discharge Medication List as of 5/26/2022 11:48 PM          (Please note that portions of this note were completed with a voice recognition program.  Efforts were made to edit the dictations but occasionally words are mis-transcribed.)         NICK Cabrera CNP, APRN - CNP  05/27/22 5801

## 2022-06-24 ENCOUNTER — APPOINTMENT (OUTPATIENT)
Dept: CT IMAGING | Age: 32
End: 2022-06-24
Payer: COMMERCIAL

## 2022-06-24 ENCOUNTER — APPOINTMENT (OUTPATIENT)
Dept: GENERAL RADIOLOGY | Age: 32
End: 2022-06-24
Payer: COMMERCIAL

## 2022-06-24 ENCOUNTER — HOSPITAL ENCOUNTER (EMERGENCY)
Age: 32
Discharge: ANOTHER HOSPITAL | End: 2022-06-24
Payer: COMMERCIAL

## 2022-06-24 VITALS
BODY MASS INDEX: 22.53 KG/M2 | HEIGHT: 73 IN | WEIGHT: 170 LBS | HEART RATE: 83 BPM | SYSTOLIC BLOOD PRESSURE: 105 MMHG | TEMPERATURE: 97.9 F | OXYGEN SATURATION: 100 % | RESPIRATION RATE: 18 BRPM | DIASTOLIC BLOOD PRESSURE: 68 MMHG

## 2022-06-24 DIAGNOSIS — T18.108A FOREIGN BODY IN ESOPHAGUS, INITIAL ENCOUNTER: Primary | ICD-10-CM

## 2022-06-24 PROCEDURE — 74018 RADEX ABDOMEN 1 VIEW: CPT

## 2022-06-24 PROCEDURE — 70360 X-RAY EXAM OF NECK: CPT

## 2022-06-24 PROCEDURE — 99285 EMERGENCY DEPT VISIT HI MDM: CPT

## 2022-06-24 PROCEDURE — 71046 X-RAY EXAM CHEST 2 VIEWS: CPT

## 2022-06-24 PROCEDURE — 6370000000 HC RX 637 (ALT 250 FOR IP): Performed by: PHYSICIAN ASSISTANT

## 2022-06-24 PROCEDURE — 74176 CT ABD & PELVIS W/O CONTRAST: CPT

## 2022-06-24 PROCEDURE — 71250 CT THORAX DX C-: CPT

## 2022-06-24 RX ADMIN — LIDOCAINE HYDROCHLORIDE: 20 SOLUTION ORAL; TOPICAL at 16:48

## 2022-06-24 ASSESSMENT — PAIN DESCRIPTION - LOCATION
LOCATION: THROAT
LOCATION: THROAT

## 2022-06-24 ASSESSMENT — PAIN - FUNCTIONAL ASSESSMENT
PAIN_FUNCTIONAL_ASSESSMENT: 0-10

## 2022-06-24 ASSESSMENT — ENCOUNTER SYMPTOMS
SORE THROAT: 1
ABDOMINAL PAIN: 1
CHEST TIGHTNESS: 0
VOMITING: 1
TROUBLE SWALLOWING: 1
SHORTNESS OF BREATH: 0

## 2022-06-24 ASSESSMENT — PAIN SCALES - GENERAL
PAINLEVEL_OUTOF10: 8
PAINLEVEL_OUTOF10: 7
PAINLEVEL_OUTOF10: 7

## 2022-06-24 NOTE — ED NOTES
Bedside report received from [facility]. Pt resting on cot. Pt states he is feeling dehydrated. Pt provided with wet wash cloth to moisten lips. Voices no other needs. Vitals stable. Guards at bedside.       Lina Tavarez RN  06/24/22 1919

## 2022-06-24 NOTE — ED TRIAGE NOTES
Patient to ED by [facility] alf with complaints of around 1300 swallowing 3 pen tops as well as a few pencils as well as shoving a spoon in his penis. Patient rates pain 7/10 in throat at this time. Pt states he tried to eat after swallowing the pens and thinks he may have it stuck in his throat because he was not able to eat and puked. Patient vital signs stable and respirations unlabored.

## 2022-06-24 NOTE — ED NOTES
Pt given urinal at this time. Pt requesting pain medication for throat. RN to notify provider.      Oneal Sánchez RN  06/24/22 0437
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
## 2022-06-25 NOTE — ED NOTES
Pt resting on cot. VSS. Call light in reach. Guards at bedside.       Rich Pritchard RN  06/24/22 2011

## 2022-06-25 NOTE — ED PROVIDER NOTES
[facility]  eMERGENCY dEPARTMENT eNCOUnter     Pt Name: Aaliyah Orona  MRN: [id number]  [place] 1990  Date of evaluation: 6/24/22        Mid-level provider Note:    I have personally performed and/or participated in the history, exam and medical decision making and agree with all pertinent clinical information as noted by the previous provider. I have also reviewed and agree with the past medical, family and social history unless otherwise noted. I have personally performed a face to face diagnostic evaluation on this patient. I have reviewed the previous provider's findings and agree. Evaluation: Patient was handed off to me via Theadora Mood, awaiting imaging results. CT chest reveals 10 mm radiopaque foreign body along the right lateral aspect of the thoracic esophagus below the brannon. CT abdomen shows possible 8 mm foreign body in the distal transverse colon. Discussed the case with [facility] transfer center, they accept the patient for transfer to the ER. Accepting physician is Dr. Seble Ruby. Discussed my findings and plan of care with the patient is agreeable with this plan of care. While here in the ER the patient maintained stable course appropriate for transfer. 1. Foreign body in esophagus, initial encounter        DISPOSITION/PLAN  PATIENT REFERRED TO:  No follow-up provider specified.   DISCHARGE MEDICATIONS:  New Prescriptions    No medications on file         TerhyperWALLET SystemsRicardo Veanali [address] [place], NICK - [place]  06/24/22 2025       Carina Mccartney. Nancy, NICK - [place]CHITA  06/24/22 2133

## 2022-06-25 NOTE — ED NOTES
Pt being transported at this time in stable condition to [employer], RN  06/24/22 2046

## 2022-08-23 ENCOUNTER — APPOINTMENT (OUTPATIENT)
Dept: CT IMAGING | Age: 32
End: 2022-08-23
Payer: COMMERCIAL

## 2022-08-23 ENCOUNTER — APPOINTMENT (OUTPATIENT)
Dept: GENERAL RADIOLOGY | Age: 32
End: 2022-08-23
Payer: COMMERCIAL

## 2022-08-23 ENCOUNTER — HOSPITAL ENCOUNTER (EMERGENCY)
Age: 32
Discharge: ANOTHER HOSPITAL | End: 2022-08-24
Attending: EMERGENCY MEDICINE
Payer: COMMERCIAL

## 2022-08-23 DIAGNOSIS — T19.0XXA URETHRAL FOREIGN BODY, INITIAL ENCOUNTER: ICD-10-CM

## 2022-08-23 DIAGNOSIS — T18.108A FOREIGN BODY IN ESOPHAGUS, INITIAL ENCOUNTER: Primary | ICD-10-CM

## 2022-08-23 PROCEDURE — 99285 EMERGENCY DEPT VISIT HI MDM: CPT

## 2022-08-23 PROCEDURE — 70360 X-RAY EXAM OF NECK: CPT

## 2022-08-23 PROCEDURE — 2580000003 HC RX 258: Performed by: EMERGENCY MEDICINE

## 2022-08-23 PROCEDURE — 96374 THER/PROPH/DIAG INJ IV PUSH: CPT

## 2022-08-23 PROCEDURE — 71250 CT THORAX DX C-: CPT

## 2022-08-23 PROCEDURE — 6360000002 HC RX W HCPCS: Performed by: EMERGENCY MEDICINE

## 2022-08-23 PROCEDURE — 74176 CT ABD & PELVIS W/O CONTRAST: CPT

## 2022-08-23 RX ORDER — MORPHINE SULFATE 2 MG/ML
2 INJECTION, SOLUTION INTRAMUSCULAR; INTRAVENOUS ONCE
Status: COMPLETED | OUTPATIENT
Start: 2022-08-23 | End: 2022-08-23

## 2022-08-23 RX ORDER — SODIUM CHLORIDE 9 MG/ML
INJECTION, SOLUTION INTRAVENOUS CONTINUOUS
Status: DISCONTINUED | OUTPATIENT
Start: 2022-08-23 | End: 2022-08-24 | Stop reason: HOSPADM

## 2022-08-23 RX ORDER — ONDANSETRON 2 MG/ML
4 INJECTION INTRAMUSCULAR; INTRAVENOUS ONCE
Status: COMPLETED | OUTPATIENT
Start: 2022-08-23 | End: 2022-08-23

## 2022-08-23 RX ADMIN — SODIUM CHLORIDE: 9 INJECTION, SOLUTION INTRAVENOUS at 20:47

## 2022-08-23 RX ADMIN — MORPHINE SULFATE 2 MG: 2 INJECTION, SOLUTION INTRAMUSCULAR; INTRAVENOUS at 21:14

## 2022-08-23 RX ADMIN — ONDANSETRON 4 MG: 2 INJECTION INTRAMUSCULAR; INTRAVENOUS at 23:04

## 2022-08-23 ASSESSMENT — PAIN - FUNCTIONAL ASSESSMENT: PAIN_FUNCTIONAL_ASSESSMENT: 0-10

## 2022-08-23 ASSESSMENT — PAIN DESCRIPTION - LOCATION: LOCATION: THROAT

## 2022-08-23 ASSESSMENT — PAIN SCALES - GENERAL: PAINLEVEL_OUTOF10: 8
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
## 2022-08-23 NOTE — ED TRIAGE NOTES
Pt presents to the ED from [place] [address] with c/o foreign body in urethra and swallowing razor blades. Pt states he swallowed three razor blades around 1730 because he was feeling anxious. Pt states he also broke a spoon in two pieces and put it in his urethra. Pt states the last time he urinated was around 1430. Pt currently denying abdominal pain but states he is having pain in his throat.  RR easy and unlabored

## 2022-08-24 VITALS
TEMPERATURE: 98 F | SYSTOLIC BLOOD PRESSURE: 98 MMHG | WEIGHT: 170 LBS | OXYGEN SATURATION: 96 % | BODY MASS INDEX: 23.03 KG/M2 | HEART RATE: 81 BPM | DIASTOLIC BLOOD PRESSURE: 66 MMHG | HEIGHT: 72 IN | RESPIRATION RATE: 18 BRPM
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
## 2022-08-24 NOTE — ED NOTES
Pt and vs reassessed. RR easy and unlabored. Pt states he is nauseous. Pt medicated per [facility] and denies any other needs. No distress noted.  Pt stable at this time     Lupe Merino, [place]  08/23/22 9659
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
## 2023-09-01 ENCOUNTER — HOSPITAL ENCOUNTER (EMERGENCY)
Age: 33
Discharge: OTHER INSTITUTION | End: 2023-09-01
Attending: EMERGENCY MEDICINE
Payer: COMMERCIAL

## 2023-09-01 VITALS
TEMPERATURE: 98.7 F | WEIGHT: 145 LBS | BODY MASS INDEX: 19.67 KG/M2 | HEART RATE: 66 BPM | DIASTOLIC BLOOD PRESSURE: 82 MMHG | RESPIRATION RATE: 16 BRPM | SYSTOLIC BLOOD PRESSURE: 108 MMHG | OXYGEN SATURATION: 99 %

## 2023-09-01 DIAGNOSIS — T74.21XA REPORTED SEXUAL ASSAULT OF ADULT: Primary | ICD-10-CM

## 2023-09-01 PROCEDURE — 2720000011 HC SANE KIT SUPPLY STERILE

## 2023-09-01 PROCEDURE — 99282 EMERGENCY DEPT VISIT SF MDM: CPT

## 2023-09-01 ASSESSMENT — ENCOUNTER SYMPTOMS
BACK PAIN: 0
TROUBLE SWALLOWING: 0
WHEEZING: 0
DIARRHEA: 0
EYE DISCHARGE: 0
COUGH: 0
SHORTNESS OF BREATH: 0
EYE ITCHING: 0
SORE THROAT: 0
VOICE CHANGE: 0
SINUS PRESSURE: 0
ABDOMINAL PAIN: 0
NAUSEA: 0
CONSTIPATION: 0
EYE PAIN: 0
EYE REDNESS: 0
VOMITING: 0
ABDOMINAL DISTENTION: 0
PHOTOPHOBIA: 0
RHINORRHEA: 0
BLOOD IN STOOL: 0
CHEST TIGHTNESS: 0
CHOKING: 0

## 2023-09-01 ASSESSMENT — PAIN - FUNCTIONAL ASSESSMENT: PAIN_FUNCTIONAL_ASSESSMENT: NONE - DENIES PAIN

## 2023-09-02 NOTE — ED NOTES
Rectal assessment completed via Dr Harini Fung accompanied by this RN.      Ravi Musa, RN  09/01/23 2012
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
